# Patient Record
Sex: FEMALE | Race: WHITE | Employment: FULL TIME | URBAN - METROPOLITAN AREA
[De-identification: names, ages, dates, MRNs, and addresses within clinical notes are randomized per-mention and may not be internally consistent; named-entity substitution may affect disease eponyms.]

---

## 2018-06-20 ENCOUNTER — HOSPITAL ENCOUNTER (EMERGENCY)
Age: 33
Discharge: HOME | End: 2018-06-21
Attending: EMERGENCY MEDICINE
Payer: COMMERCIAL

## 2018-06-20 ENCOUNTER — APPOINTMENT (OUTPATIENT)
Dept: GENERAL RADIOLOGY | Age: 33
End: 2018-06-20
Payer: COMMERCIAL

## 2018-06-20 ENCOUNTER — APPOINTMENT (OUTPATIENT)
Dept: CT IMAGING | Age: 33
End: 2018-06-20
Payer: COMMERCIAL

## 2018-06-20 DIAGNOSIS — S01.01XA LACERATION OF SCALP, INITIAL ENCOUNTER: ICD-10-CM

## 2018-06-20 DIAGNOSIS — F10.920 ACUTE ALCOHOLIC INTOXICATION WITHOUT COMPLICATION (HCC): ICD-10-CM

## 2018-06-20 DIAGNOSIS — R46.89 COMBATIVE BEHAVIOR: Primary | ICD-10-CM

## 2018-06-20 DIAGNOSIS — D72.829 LEUKOCYTOSIS, UNSPECIFIED TYPE: ICD-10-CM

## 2018-06-20 LAB
ABSOLUTE EOS #: 0 K/UL (ref 0–0.4)
ABSOLUTE IMMATURE GRANULOCYTE: ABNORMAL K/UL (ref 0–0.3)
ABSOLUTE LYMPH #: 3.02 K/UL (ref 1–4.8)
ABSOLUTE MONO #: 0.32 K/UL (ref 0.1–1.3)
ACETAMINOPHEN LEVEL: <5 UG/ML (ref 10–30)
ALBUMIN SERPL-MCNC: 4.5 G/DL (ref 3.5–5.2)
ALBUMIN/GLOBULIN RATIO: ABNORMAL (ref 1–2.5)
ALP BLD-CCNC: 56 U/L (ref 35–104)
ALT SERPL-CCNC: 20 U/L (ref 5–33)
ANION GAP SERPL CALCULATED.3IONS-SCNC: 16 MMOL/L (ref 9–17)
AST SERPL-CCNC: 30 U/L
BASOPHILS # BLD: 0 % (ref 0–2)
BASOPHILS ABSOLUTE: 0 K/UL (ref 0–0.2)
BILIRUB SERPL-MCNC: <0.15 MG/DL (ref 0.3–1.2)
BUN BLDV-MCNC: 7 MG/DL (ref 6–20)
BUN/CREAT BLD: ABNORMAL (ref 9–20)
CALCIUM SERPL-MCNC: 8.8 MG/DL (ref 8.6–10.4)
CHLORIDE BLD-SCNC: 108 MMOL/L (ref 98–107)
CO2: 22 MMOL/L (ref 20–31)
CREAT SERPL-MCNC: 0.69 MG/DL (ref 0.5–0.9)
DIFFERENTIAL TYPE: ABNORMAL
EKG ATRIAL RATE: 86 BPM
EKG P AXIS: 56 DEGREES
EKG P-R INTERVAL: 170 MS
EKG Q-T INTERVAL: 370 MS
EKG QRS DURATION: 92 MS
EKG QTC CALCULATION (BAZETT): 442 MS
EKG R AXIS: 9 DEGREES
EKG T AXIS: 33 DEGREES
EKG VENTRICULAR RATE: 86 BPM
EOSINOPHILS RELATIVE PERCENT: 0 % (ref 0–4)
ETHANOL PERCENT: 0.23 %
ETHANOL: 229 MG/DL
GFR AFRICAN AMERICAN: >60 ML/MIN
GFR NON-AFRICAN AMERICAN: >60 ML/MIN
GFR SERPL CREATININE-BSD FRML MDRD: ABNORMAL ML/MIN/{1.73_M2}
GFR SERPL CREATININE-BSD FRML MDRD: ABNORMAL ML/MIN/{1.73_M2}
GLUCOSE BLD-MCNC: 108 MG/DL (ref 70–99)
HCT VFR BLD CALC: 41 % (ref 36–46)
HEMOGLOBIN: 13.9 G/DL (ref 12–16)
IMMATURE GRANULOCYTES: ABNORMAL %
LYMPHOCYTES # BLD: 19 % (ref 24–44)
MCH RBC QN AUTO: 27.9 PG (ref 26–34)
MCHC RBC AUTO-ENTMCNC: 33.8 G/DL (ref 31–37)
MCV RBC AUTO: 82.6 FL (ref 80–100)
MONOCYTES # BLD: 2 % (ref 1–7)
MORPHOLOGY: NORMAL
NRBC AUTOMATED: ABNORMAL PER 100 WBC
PDW BLD-RTO: 13.9 % (ref 11.5–14.9)
PLATELET # BLD: 333 K/UL (ref 150–450)
PLATELET ESTIMATE: ABNORMAL
PMV BLD AUTO: 8.6 FL (ref 6–12)
POTASSIUM SERPL-SCNC: 3.6 MMOL/L (ref 3.7–5.3)
RBC # BLD: 4.97 M/UL (ref 4–5.2)
RBC # BLD: ABNORMAL 10*6/UL
SALICYLATE LEVEL: <1 MG/DL (ref 3–10)
SEG NEUTROPHILS: 79 % (ref 36–66)
SEGMENTED NEUTROPHILS ABSOLUTE COUNT: 12.56 K/UL (ref 1.3–9.1)
SODIUM BLD-SCNC: 146 MMOL/L (ref 135–144)
TOTAL PROTEIN: 7.5 G/DL (ref 6.4–8.3)
WBC # BLD: 15.9 K/UL (ref 3.5–11)
WBC # BLD: ABNORMAL 10*3/UL

## 2018-06-20 PROCEDURE — 80307 DRUG TEST PRSMV CHEM ANLYZR: CPT

## 2018-06-20 PROCEDURE — 96374 THER/PROPH/DIAG INJ IV PUSH: CPT

## 2018-06-20 PROCEDURE — 73560 X-RAY EXAM OF KNEE 1 OR 2: CPT

## 2018-06-20 PROCEDURE — 80053 COMPREHEN METABOLIC PANEL: CPT

## 2018-06-20 PROCEDURE — 70450 CT HEAD/BRAIN W/O DYE: CPT

## 2018-06-20 PROCEDURE — G0480 DRUG TEST DEF 1-7 CLASSES: HCPCS

## 2018-06-20 PROCEDURE — 71046 X-RAY EXAM CHEST 2 VIEWS: CPT

## 2018-06-20 PROCEDURE — 85025 COMPLETE CBC W/AUTO DIFF WBC: CPT

## 2018-06-20 PROCEDURE — 6360000002 HC RX W HCPCS: Performed by: EMERGENCY MEDICINE

## 2018-06-20 PROCEDURE — 99284 EMERGENCY DEPT VISIT MOD MDM: CPT

## 2018-06-20 PROCEDURE — 84703 CHORIONIC GONADOTROPIN ASSAY: CPT

## 2018-06-20 PROCEDURE — 93005 ELECTROCARDIOGRAM TRACING: CPT

## 2018-06-20 PROCEDURE — 36415 COLL VENOUS BLD VENIPUNCTURE: CPT

## 2018-06-20 RX ORDER — ONDANSETRON 2 MG/ML
4 INJECTION INTRAMUSCULAR; INTRAVENOUS ONCE
Status: COMPLETED | OUTPATIENT
Start: 2018-06-20 | End: 2018-06-20

## 2018-06-20 RX ORDER — LAMOTRIGINE 25 MG/1
25 TABLET ORAL DAILY
COMMUNITY

## 2018-06-20 RX ADMIN — ONDANSETRON 4 MG: 2 INJECTION INTRAMUSCULAR; INTRAVENOUS at 23:57

## 2018-06-20 ASSESSMENT — PAIN DESCRIPTION - LOCATION: LOCATION: GENERALIZED

## 2018-06-20 ASSESSMENT — PAIN DESCRIPTION - PAIN TYPE: TYPE: ACUTE PAIN

## 2018-06-20 ASSESSMENT — PAIN SCALES - GENERAL: PAINLEVEL_OUTOF10: 10

## 2018-06-21 ENCOUNTER — APPOINTMENT (OUTPATIENT)
Dept: GENERAL RADIOLOGY | Age: 33
End: 2018-06-21
Payer: COMMERCIAL

## 2018-06-21 VITALS
HEART RATE: 93 BPM | TEMPERATURE: 98.4 F | WEIGHT: 180 LBS | OXYGEN SATURATION: 97 % | HEIGHT: 62 IN | RESPIRATION RATE: 23 BRPM | SYSTOLIC BLOOD PRESSURE: 113 MMHG | DIASTOLIC BLOOD PRESSURE: 72 MMHG | BODY MASS INDEX: 33.13 KG/M2

## 2018-06-21 LAB — HCG QUALITATIVE: NEGATIVE

## 2018-06-21 PROCEDURE — 96375 TX/PRO/DX INJ NEW DRUG ADDON: CPT

## 2018-06-21 PROCEDURE — 73560 X-RAY EXAM OF KNEE 1 OR 2: CPT

## 2018-06-21 PROCEDURE — 6360000002 HC RX W HCPCS: Performed by: EMERGENCY MEDICINE

## 2018-06-21 RX ORDER — LORAZEPAM 2 MG/ML
2 INJECTION INTRAMUSCULAR ONCE
Status: COMPLETED | OUTPATIENT
Start: 2018-06-21 | End: 2018-06-21

## 2018-06-21 RX ADMIN — LORAZEPAM 2 MG: 2 INJECTION INTRAMUSCULAR; INTRAVENOUS at 01:06

## 2018-06-21 ASSESSMENT — ENCOUNTER SYMPTOMS
BACK PAIN: 0
ABDOMINAL PAIN: 0
DIARRHEA: 0
NAUSEA: 0
SORE THROAT: 0
COUGH: 0
VOMITING: 0
EYE PAIN: 0
SHORTNESS OF BREATH: 0

## 2021-12-20 ENCOUNTER — HOSPITAL ENCOUNTER (EMERGENCY)
Facility: CLINIC | Age: 36
Discharge: HOME | End: 2021-12-20
Attending: SPECIALIST
Payer: COMMERCIAL

## 2021-12-20 VITALS
HEART RATE: 85 BPM | OXYGEN SATURATION: 100 % | SYSTOLIC BLOOD PRESSURE: 97 MMHG | TEMPERATURE: 98 F | DIASTOLIC BLOOD PRESSURE: 60 MMHG | WEIGHT: 140 LBS | RESPIRATION RATE: 16 BRPM | BODY MASS INDEX: 25.76 KG/M2 | HEIGHT: 62 IN

## 2021-12-20 DIAGNOSIS — R11.2 NAUSEA AND VOMITING, INTRACTABILITY OF VOMITING NOT SPECIFIED, UNSPECIFIED VOMITING TYPE: Primary | ICD-10-CM

## 2021-12-20 LAB
ABSOLUTE EOS #: 0 K/UL (ref 0–0.4)
ABSOLUTE IMMATURE GRANULOCYTE: ABNORMAL K/UL (ref 0–0.3)
ABSOLUTE LYMPH #: 1 K/UL (ref 1–4.8)
ABSOLUTE MONO #: 1.1 K/UL (ref 0.1–1.2)
ALBUMIN SERPL-MCNC: 4.9 G/DL (ref 3.5–5.2)
ALBUMIN/GLOBULIN RATIO: 2.6 (ref 1–2.5)
ALP BLD-CCNC: 57 U/L (ref 35–104)
ALT SERPL-CCNC: 26 U/L (ref 5–33)
ANION GAP SERPL CALCULATED.3IONS-SCNC: 12 MMOL/L (ref 9–17)
AST SERPL-CCNC: 25 U/L
BASOPHILS # BLD: 0 % (ref 0–2)
BASOPHILS ABSOLUTE: 0 K/UL (ref 0–0.2)
BILIRUB SERPL-MCNC: 0.5 MG/DL (ref 0.3–1.2)
BUN BLDV-MCNC: 23 MG/DL (ref 6–20)
BUN/CREAT BLD: ABNORMAL (ref 9–20)
CALCIUM SERPL-MCNC: 9.6 MG/DL (ref 8.6–10.4)
CHLORIDE BLD-SCNC: 104 MMOL/L (ref 98–107)
CO2: 24 MMOL/L (ref 20–31)
CREAT SERPL-MCNC: 0.7 MG/DL (ref 0.5–0.9)
DIFFERENTIAL TYPE: ABNORMAL
EOSINOPHILS RELATIVE PERCENT: 0 % (ref 1–4)
GFR AFRICAN AMERICAN: >60 ML/MIN
GFR NON-AFRICAN AMERICAN: >60 ML/MIN
GFR SERPL CREATININE-BSD FRML MDRD: ABNORMAL ML/MIN/{1.73_M2}
GFR SERPL CREATININE-BSD FRML MDRD: ABNORMAL ML/MIN/{1.73_M2}
GLUCOSE BLD-MCNC: 130 MG/DL (ref 70–99)
HCT VFR BLD CALC: 38.1 % (ref 36–46)
HEMOGLOBIN: 12.3 G/DL (ref 12–16)
IMMATURE GRANULOCYTES: ABNORMAL %
LIPASE: 22 U/L (ref 13–60)
LYMPHOCYTES # BLD: 5 % (ref 24–44)
MCH RBC QN AUTO: 26.2 PG (ref 26–34)
MCHC RBC AUTO-ENTMCNC: 32.2 G/DL (ref 31–37)
MCV RBC AUTO: 81.3 FL (ref 80–100)
MONOCYTES # BLD: 6 % (ref 2–11)
NRBC AUTOMATED: ABNORMAL PER 100 WBC
PDW BLD-RTO: 14 % (ref 12.5–15.4)
PLATELET # BLD: 388 K/UL (ref 140–450)
PLATELET ESTIMATE: ABNORMAL
PMV BLD AUTO: 7.2 FL (ref 6–12)
POTASSIUM SERPL-SCNC: 4 MMOL/L (ref 3.7–5.3)
RBC # BLD: 4.68 M/UL (ref 4–5.2)
RBC # BLD: ABNORMAL 10*6/UL
SEG NEUTROPHILS: 89 % (ref 36–66)
SEGMENTED NEUTROPHILS ABSOLUTE COUNT: 18.7 K/UL (ref 1.8–7.7)
SODIUM BLD-SCNC: 140 MMOL/L (ref 135–144)
TOTAL PROTEIN: 6.8 G/DL (ref 6.4–8.3)
WBC # BLD: 20.9 K/UL (ref 3.5–11)
WBC # BLD: ABNORMAL 10*3/UL

## 2021-12-20 PROCEDURE — 96374 THER/PROPH/DIAG INJ IV PUSH: CPT

## 2021-12-20 PROCEDURE — 80053 COMPREHEN METABOLIC PANEL: CPT

## 2021-12-20 PROCEDURE — 6360000002 HC RX W HCPCS

## 2021-12-20 PROCEDURE — 83690 ASSAY OF LIPASE: CPT

## 2021-12-20 PROCEDURE — 96375 TX/PRO/DX INJ NEW DRUG ADDON: CPT

## 2021-12-20 PROCEDURE — 85025 COMPLETE CBC W/AUTO DIFF WBC: CPT

## 2021-12-20 PROCEDURE — 2580000003 HC RX 258

## 2021-12-20 PROCEDURE — 99283 EMERGENCY DEPT VISIT LOW MDM: CPT

## 2021-12-20 PROCEDURE — 36415 COLL VENOUS BLD VENIPUNCTURE: CPT

## 2021-12-20 RX ORDER — 0.9 % SODIUM CHLORIDE 0.9 %
1000 INTRAVENOUS SOLUTION INTRAVENOUS ONCE
Status: COMPLETED | OUTPATIENT
Start: 2021-12-20 | End: 2021-12-20

## 2021-12-20 RX ORDER — ONDANSETRON 2 MG/ML
4 INJECTION INTRAMUSCULAR; INTRAVENOUS ONCE
Status: COMPLETED | OUTPATIENT
Start: 2021-12-20 | End: 2021-12-20

## 2021-12-20 RX ORDER — KETOROLAC TROMETHAMINE 30 MG/ML
30 INJECTION, SOLUTION INTRAMUSCULAR; INTRAVENOUS ONCE
Status: COMPLETED | OUTPATIENT
Start: 2021-12-20 | End: 2021-12-20

## 2021-12-20 RX ORDER — ONDANSETRON 4 MG/1
4 TABLET, ORALLY DISINTEGRATING ORAL EVERY 8 HOURS PRN
Qty: 10 TABLET | Refills: 0 | Status: SHIPPED | OUTPATIENT
Start: 2021-12-20 | End: 2021-12-30

## 2021-12-20 RX ADMIN — KETOROLAC TROMETHAMINE 30 MG: 30 INJECTION, SOLUTION INTRAMUSCULAR; INTRAVENOUS at 20:18

## 2021-12-20 RX ADMIN — SODIUM CHLORIDE 1000 ML: 9 INJECTION, SOLUTION INTRAVENOUS at 20:14

## 2021-12-20 RX ADMIN — ONDANSETRON 4 MG: 2 INJECTION INTRAMUSCULAR; INTRAVENOUS at 20:16

## 2021-12-20 ASSESSMENT — PAIN DESCRIPTION - FREQUENCY: FREQUENCY: CONTINUOUS

## 2021-12-20 ASSESSMENT — PAIN DESCRIPTION - ORIENTATION: ORIENTATION: RIGHT

## 2021-12-20 ASSESSMENT — PAIN DESCRIPTION - LOCATION: LOCATION: RIB CAGE

## 2021-12-20 ASSESSMENT — PAIN DESCRIPTION - DESCRIPTORS: DESCRIPTORS: SHARP

## 2021-12-20 ASSESSMENT — PAIN DESCRIPTION - PAIN TYPE: TYPE: ACUTE PAIN

## 2021-12-20 ASSESSMENT — PAIN DESCRIPTION - ONSET: ONSET: SUDDEN

## 2021-12-20 ASSESSMENT — PAIN SCALES - GENERAL: PAINLEVEL_OUTOF10: 7

## 2021-12-21 NOTE — ED PROVIDER NOTES
[facility]  [facility]  Phone: [phone] [facility],[place] [address]      Pt Name: Brooke Chisholm  MRN: [id number]  [place] 1985  Date of evaluation: 12/20/21      CHIEF COMPLAINT:  Chief Complaint   Patient presents with    Emesis    Sweats       HISTORY OF PRESENT ILLNESS    Brooke Chisholm is a 29 y.o. female who presents with complaints of right upper quadrant abdominal pain with nausea, vomiting and chills that she states started approximately 6 hours ago. She states that she was diagnosed 6 days ago at her primary care physician's office with strep and has been taking amoxicillin. She states that she has been tolerating her antibiotic. She is complaining of right upper quadrant abdominal pain that started today after onset of vomiting that she relates to a \"pulled muscle\". She states that when she gets ill she is normally \"pukey\". She is specifically requesting IV with fluids and Zofran or Phenergan and nonnarcotic pain medication. She denies fevers, diarrhea, dysuria, back pain, vaginal discharge or bleeding. She describes her pain to her right upper quadrant as sharp and moderate in intensity she states the pain is increased with movement and when she vomits. Nursing Notes were reviewed. REVIEW OF SYSTEMS       Constitutional: Complaining of chills. Denies fevers. Eyes: No visual changes. Neck: No neck pain. Respiratory: Denies recent shortness of breath. Cardiac:  Denies recent chest pain or palpitations  GI: Complaning of right upper quadrant abdominal pain with nausea and vomiting  : Denies dysuria or vaginal bleeding/discharge  Musculoskeletal: Denies myalgias or extremity pain  Neurologic: Denies headache or focal weakness. Skin:  Denies any rash. Negative in 10 essential Systems except as mentioned above and in the HPI. PAST MEDICAL HISTORY   PMH:  has no past medical history on file.   Surgical History:  has no past surgical history on file. Social History:  reports that she has quit smoking. Her smoking use included cigarettes. She does not have any smokeless tobacco history on file. She reports that she does not drink alcohol and does not use drugs. Family History: None  Psychiatric History: None    Allergies:has No Known Allergies. PHYSICAL EXAM     INITIAL VITALS: BP (!) 137/99   Pulse 65   Temp 98 °F (36.7 °C) (Oral)   Resp 12   Ht 5' 2\" (1.575 m)   Wt 63.5 kg (140 lb)   LMP  (Within Months) Comment: irregular due to birth control  SpO2 100%   BMI 25.61 kg/m²   Constitutional:  Well developed   Eyes:  Pupils equal/round  HENT:  Atraumatic, external ears normal, nose normal, oropharynx moist, posterior pharynx/tonsils without erythema or exudates  Respiratory:  Clear to auscultation bilaterally with good air exchange, no W/R/R  Cardiovascular:  RRR with normal S1 and S2  Gastrointestinal/Abdomen:  Soft, right upper quadrant TTP, BS present. No RLQ TTP. No mass appreciated. Musculoskeletal:  Normal to inspection  Back:  No midline/paraspinal TTP. No bilat CVA/flank TTP. Normal to inspection. Integument:  No rash.     Neurologic:  Alert & age appropriate mentation-interaction      DIAGNOSTIC RESULTS     EKG: All EKG's are interpreted by the Emergency Department Physician who either signs or Co-signs this chart in the absence of a cardiologist.    Not indicated    RADIOLOGY:   Reviewed the radiologist:  No orders to display       Not indicated      LABS:  Labs Reviewed   CBC WITH AUTO DIFFERENTIAL - Abnormal; Notable for the following components:       Result Value    WBC 20.9 (*)     Seg Neutrophils 89 (*)     Lymphocytes 5 (*)     Eosinophils % 0 (*)     Segs Absolute 18.70 (*)     All other components within normal limits   COMPREHENSIVE METABOLIC PANEL - Abnormal; Notable for the following components:    Glucose 130 (*)     BUN 23 (*)     Albumin/Globulin Ratio 2.6 (*)     All other components within normal limits   LIPASE         EMERGENCY DEPARTMENT COURSE, DDX and MDM:     IV with fluids. Labs to include CBC, CMP, lipase. Zofran for nausea. Toradol for pain. Will reevaluate. 2050: CBC indicates updated WBC 20.9, patient states that pain is resolved upon reassessment and is declining any further intervention at this time. She is requesting a prescription for Zofran ODT. Will prescribe and encourage follow-up with primary care physician. We estimate there is LOW risk for ACUTE APPENDICITIS, BOWEL OBSTRUCTION, CHOLECYSTITIS, DIVERTICULITIS, INCARCERATED HERNIA, PANCREATITIS, or PERFORATED BOWEL or ULCER, thus we consider the discharge disposition reasonable. Re-evaluation of the abdomen is benign. No guarding, peritoneal signs or significant tenderness noted. Also, there is no evidence or peritonitis, sepsis, or toxicity. The patient and/or family and I have discussed the diagnosis and risks, and we agree with discharging home to follow-up with their primary doctor. The patient presents with abdominal pain without signs of peritonitis or other life-threatening or serious etiology. The patient appears stable for discharge and has been instructed to return immediately if the symptoms worsen in any way, or in 8-12 hr if not improved for re-evaluation. We also discussed returning to the Emergency Department immediately if new or worsening symptoms occur. We have discussed the symptoms which are most concerning (e.g., bloody stool, fever, changing or worsening pain, persistent vomiting, chest pain shortness of breath, numbness or weakness to the arms or legs, coolness or color change to the arms or legs) that necessitate immediate return. The patient understands that at this time there is no evidence for a more malignant underlying process, but the patient also understands that early in the process of an illness or injury, an emergency department workup can be falsely reassuring.   Routine discharge counseling was given, and the patient understands that worsening, changing or persistent symptoms should prompt an immediate call or follow up with their primary physician or return to the emergency department. The importance of appropriate follow up was also discussed. We have reviewed the disposition diagnosis with the patient and or their family/guardian. We have answered their questions and given discharge instructions. They voiced understanding of these instructions and did not have any further questions or complaints. Orders Placed This Encounter   Medications    0.9 % sodium chloride bolus    ondansetron (ZOFRAN) injection 4 mg    ketorolac (TORADOL) injection 30 mg    ondansetron (ZOFRAN ODT) 4 MG disintegrating tablet     Sig: Take 1 tablet by mouth every 8 hours as needed for Nausea or Vomiting     Dispense:  10 tablet     Refill:  0       CONSULTS:  None      FINAL IMPRESSION      1.  Nausea and vomiting, intractability of vomiting not specified, unspecified vomiting type          DISPOSITION/PLAN:  DISPOSITION        PATIENT REFERRED TO:  Sandi Harman MD  [facility]  [phone]    Call in 2 days      [facility]  / [address] [phone]    As needed      DISCHARGE MEDICATIONS:  New Prescriptions    ONDANSETRON (ZOFRAN ODT) 4 MG DISINTEGRATING TABLET    Take 1 tablet by mouth every 8 hours as needed for Nausea or Vomiting       (Please note that portions of this note were completed with a voice recognition program.  Efforts were made to edit the dictations but occasionally words are mis-transcribed.)    CHRISTINA Conley - CHRISTINA Yost - CNP  12/20/21 2100       CHRISTINA Conley CNP  12/20/21 2104       CHRISTINA Conley - CNP  12/20/21 2200

## 2021-12-21 NOTE — ED PROVIDER NOTES
Emergency Department     Faculty Attestation    I performed a history and physical examination of the patient and discussed management with the mid level provideer. I reviewed the mid level provider's note and agree with the documented findings and plan of care. Any areas of disagreement are noted on the chart. I was personally present for the key portions of any procedures. I have documented in the chart those procedures where I was not present during the key portions. I have reviewed the emergency nurses triage note. I agree with the chief complaint, past medical history, past surgical history, allergies, medications, social and family history as documented unless otherwise noted below. Documentation of the HPI, Physical Exam and Medical Decision Making performed by medical students or scribes is based on my personal performance of the HPI, PE and MDM. For Physician Assistant/ Nurse Practitioner cases/documentation I have personally evaluated this patient and have completed at least one if not all key elements of the E/M (history, physical exam, and MDM). Additional findings are as noted.       Primary Care Physician:  Praveen Rojas MD    [address] [place] [address]       Chief Complaint   Patient presents with    Emesis    Sweats       RECENT VITALS:   Temp: 98 °F (36.7 °C),  Pulse: 85, Resp: 16, BP: 97/60    LABS:  Labs Reviewed   CBC WITH AUTO DIFFERENTIAL - Abnormal; Notable for the following components:       Result Value    WBC 20.9 (*)     Seg Neutrophils 89 (*)     Lymphocytes 5 (*)     Eosinophils % 0 (*)     Segs Absolute 18.70 (*)     All other components within normal limits   COMPREHENSIVE METABOLIC PANEL - Abnormal; Notable for the following components:    Glucose 130 (*)     BUN 23 (*)     Albumin/Globulin Ratio 2.6 (*)     All other components within normal limits   LIPASE         PERTINENT ATTENDING PHYSICIAN COMMENTS:    70-year-old female patient presents to the emergency department for evaluation of nausea, vomiting, abdominal pain and chills. Symptoms started about 6 hours ago. Patient was diagnosed to have streptococcal pharyngitis about 6 weeks ago and has been taking amoxicillin. Sore throat has improved. She denies any constipation or diarrhea and denies any fever or chills. She also denies any urinary symptoms. Patient is afebrile and vital signs are stable. Her lungs are clear to auscultation, rhythm is regular without murmurs. Abdomen is soft with mild tenderness in the right lower rib cage area. Kerline Abbot sign is negative. There is no hepatosplenomegaly. No pulsatile mass. She has active bowel sounds. There is no rebound tenderness. Extremities without any edema or tenderness. Neurologically no focal deficits. During the ED course, patient was given IV fluids, Toradol and Zofran. She is feeling much better and resting comfortably. Diagnostic work-up included CBC, CMP and serum lipase. She has leukocytosis, rest of the labs are unremarkable. Upon reevaluation her abdomen is soft and nontender with active bowel sounds. Patient was offered further work-up including urinalysis, chest x-ray and possibly CAT scan of the abdomen and pelvis but she declines as she is feeling much better. She was discharged home on Zofran ODT as needed for the nausea, Tylenol and/or ibuprofen as needed for the pain, continue current medications, plenty of oral fluids, follow-up with PCP, return if worse. The patient understands that at this time there is no evidence for a more malignant underlying process, but also understands that early in the process of an illness or injury, an emergency department workup can be falsely reassuring. Routine discharge counseling was given, and it is understood that worsening, changing or persistent symptoms should prompt an immediate call or follow up with their primary physician or return to the emergency department.  The importance of appropriate follow up was also discussed. I have reviewed the disposition diagnosis. I have answered the questions and given discharge instructions. There was voiced understanding of these instructions and no further questions or complaints.         Dayami Lovell MD  12/21/21 2270

## 2021-12-21 NOTE — ED TRIAGE NOTES
Pt to ED from home with c/o vomiting and chills. Also reports headache, cough, sore throat, weakness,  diarrhea, and abd pain. Onset of vomiting x6 hours ago. Other symptoms cont. from positive Strep. diagnosis. Pt currently on antibiotic regimen for Strep. Denies chest pain. Pt vaccinated. PCP - Dr. Chay Tuttle. Alert and oriented. Ambulatory with steady gait. Respirations unlabored. Abd soft and nontender.

## 2025-08-13 ENCOUNTER — HOSPITAL ENCOUNTER (EMERGENCY)
Age: 40
Discharge: HOME | End: 2025-08-13
Attending: EMERGENCY MEDICINE
Payer: COMMERCIAL

## 2025-08-13 VITALS
RESPIRATION RATE: 24 BRPM | DIASTOLIC BLOOD PRESSURE: 71 MMHG | BODY MASS INDEX: 25.61 KG/M2 | OXYGEN SATURATION: 96 % | WEIGHT: 140 LBS | SYSTOLIC BLOOD PRESSURE: 104 MMHG | HEART RATE: 65 BPM

## 2025-08-13 DIAGNOSIS — R11.2 NAUSEA AND VOMITING, UNSPECIFIED VOMITING TYPE: ICD-10-CM

## 2025-08-13 DIAGNOSIS — F41.1 ANXIETY STATE: Primary | ICD-10-CM

## 2025-08-13 LAB
ALBUMIN SERPL-MCNC: 4 G/DL (ref 3.5–5.2)
ALBUMIN/GLOB SERPL: 1.6 {RATIO} (ref 1–2.5)
ALP SERPL-CCNC: 42 U/L (ref 35–104)
ALT SERPL-CCNC: 19 U/L (ref 10–35)
ANION GAP SERPL CALCULATED.3IONS-SCNC: 14 MMOL/L (ref 9–16)
AST SERPL-CCNC: 23 U/L (ref 10–35)
BASOPHILS # BLD: 0.05 K/UL (ref 0–0.2)
BASOPHILS NFR BLD: 0 % (ref 0–2)
BILIRUB SERPL-MCNC: 0.4 MG/DL (ref 0–1.2)
BUN SERPL-MCNC: 18 MG/DL (ref 6–20)
CALCIUM SERPL-MCNC: 8.6 MG/DL (ref 8.6–10.4)
CHLORIDE SERPL-SCNC: 103 MMOL/L (ref 98–107)
CO2 SERPL-SCNC: 23 MMOL/L (ref 20–31)
CREAT SERPL-MCNC: 0.9 MG/DL (ref 0.5–0.9)
EOSINOPHIL # BLD: 0.05 K/UL (ref 0–0.44)
EOSINOPHILS RELATIVE PERCENT: 0 % (ref 1–4)
ERYTHROCYTE [DISTWIDTH] IN BLOOD BY AUTOMATED COUNT: 13.1 % (ref 11.8–14.4)
GFR, ESTIMATED: 88 ML/MIN/1.73M2
GLUCOSE SERPL-MCNC: 130 MG/DL (ref 74–99)
HCT VFR BLD AUTO: 36.6 % (ref 36.3–47.1)
HGB BLD-MCNC: 12.1 G/DL (ref 11.9–15.1)
IMM GRANULOCYTES # BLD AUTO: 0.06 K/UL (ref 0–0.3)
IMM GRANULOCYTES NFR BLD: 0 %
LIPASE SERPL-CCNC: 66 U/L (ref 13–60)
LYMPHOCYTES NFR BLD: 2.46 K/UL (ref 1.1–3.7)
LYMPHOCYTES RELATIVE PERCENT: 14 % (ref 24–43)
MCH RBC QN AUTO: 27 PG (ref 25.2–33.5)
MCHC RBC AUTO-ENTMCNC: 33.1 G/DL (ref 28.4–34.8)
MCV RBC AUTO: 81.7 FL (ref 82.6–102.9)
MONOCYTES NFR BLD: 0.85 K/UL (ref 0.1–1.2)
MONOCYTES NFR BLD: 5 % (ref 3–12)
NEUTROPHILS NFR BLD: 81 % (ref 36–65)
NEUTS SEG NFR BLD: 14.03 K/UL (ref 1.5–8.1)
NRBC BLD-RTO: 0 PER 100 WBC
PLATELET # BLD AUTO: 334 K/UL (ref 138–453)
PMV BLD AUTO: 10.6 FL (ref 8.1–13.5)
POTASSIUM SERPL-SCNC: 3.2 MMOL/L (ref 3.7–5.3)
PROT SERPL-MCNC: 6.5 G/DL (ref 6.6–8.7)
RBC # BLD AUTO: 4.48 M/UL (ref 3.95–5.11)
RBC # BLD: ABNORMAL 10*6/UL
SODIUM SERPL-SCNC: 141 MMOL/L (ref 136–145)
WBC OTHER # BLD: 17.5 K/UL (ref 3.5–11.3)

## 2025-08-13 PROCEDURE — 96374 THER/PROPH/DIAG INJ IV PUSH: CPT

## 2025-08-13 PROCEDURE — 96375 TX/PRO/DX INJ NEW DRUG ADDON: CPT

## 2025-08-13 PROCEDURE — 6360000002 HC RX W HCPCS: Performed by: EMERGENCY MEDICINE

## 2025-08-13 PROCEDURE — 80053 COMPREHEN METABOLIC PANEL: CPT

## 2025-08-13 PROCEDURE — 99284 EMERGENCY DEPT VISIT MOD MDM: CPT

## 2025-08-13 PROCEDURE — 2580000003 HC RX 258: Performed by: EMERGENCY MEDICINE

## 2025-08-13 PROCEDURE — 83690 ASSAY OF LIPASE: CPT

## 2025-08-13 PROCEDURE — 6370000000 HC RX 637 (ALT 250 FOR IP): Performed by: EMERGENCY MEDICINE

## 2025-08-13 PROCEDURE — 85025 COMPLETE CBC W/AUTO DIFF WBC: CPT

## 2025-08-13 RX ORDER — HALOPERIDOL 5 MG/ML
5 INJECTION INTRAMUSCULAR ONCE
Status: COMPLETED | OUTPATIENT
Start: 2025-08-13 | End: 2025-08-13

## 2025-08-13 RX ORDER — 0.9 % SODIUM CHLORIDE 0.9 %
1000 INTRAVENOUS SOLUTION INTRAVENOUS ONCE
Status: COMPLETED | OUTPATIENT
Start: 2025-08-13 | End: 2025-08-13

## 2025-08-13 RX ORDER — ONDANSETRON 2 MG/ML
4 INJECTION INTRAMUSCULAR; INTRAVENOUS ONCE
Status: COMPLETED | OUTPATIENT
Start: 2025-08-13 | End: 2025-08-13

## 2025-08-13 RX ORDER — LORAZEPAM 2 MG/ML
1 INJECTION INTRAMUSCULAR ONCE
Status: COMPLETED | OUTPATIENT
Start: 2025-08-13 | End: 2025-08-13

## 2025-08-13 RX ORDER — ONDANSETRON 4 MG/1
4 TABLET, ORALLY DISINTEGRATING ORAL 3 TIMES DAILY PRN
Qty: 15 TABLET | Refills: 0 | Status: SHIPPED | OUTPATIENT
Start: 2025-08-13 | End: 2025-08-28

## 2025-08-13 RX ORDER — ALPRAZOLAM 0.5 MG
0.5 TABLET ORAL ONCE
Status: COMPLETED | OUTPATIENT
Start: 2025-08-13 | End: 2025-08-13

## 2025-08-13 RX ORDER — ALPRAZOLAM 0.5 MG
0.5 TABLET ORAL 2 TIMES DAILY PRN
Qty: 20 TABLET | Refills: 0 | Status: SHIPPED | OUTPATIENT
Start: 2025-08-13 | End: 2025-08-23

## 2025-08-13 RX ORDER — LORAZEPAM 2 MG/ML
2 INJECTION INTRAMUSCULAR ONCE
Status: COMPLETED | OUTPATIENT
Start: 2025-08-13 | End: 2025-08-13

## 2025-08-13 RX ORDER — METOCLOPRAMIDE HYDROCHLORIDE 5 MG/ML
10 INJECTION INTRAMUSCULAR; INTRAVENOUS ONCE
Status: COMPLETED | OUTPATIENT
Start: 2025-08-13 | End: 2025-08-13

## 2025-08-13 RX ADMIN — METOCLOPRAMIDE HYDROCHLORIDE 10 MG: 5 INJECTION INTRAMUSCULAR; INTRAVENOUS at 10:48

## 2025-08-13 RX ADMIN — SODIUM CHLORIDE 1000 ML: 0.9 INJECTION, SOLUTION INTRAVENOUS at 10:54

## 2025-08-13 RX ADMIN — HALOPERIDOL LACTATE 5 MG: 5 INJECTION, SOLUTION INTRAMUSCULAR at 11:17

## 2025-08-13 RX ADMIN — Medication 2 MG: at 11:17

## 2025-08-13 RX ADMIN — ALPRAZOLAM 0.5 MG: 0.5 TABLET ORAL at 15:16

## 2025-08-13 RX ADMIN — ONDANSETRON 4 MG: 2 INJECTION, SOLUTION INTRAMUSCULAR; INTRAVENOUS at 15:16

## 2025-08-13 RX ADMIN — Medication 1 MG: at 10:48

## 2025-08-13 RX ADMIN — SODIUM CHLORIDE 1000 ML: 0.9 INJECTION, SOLUTION INTRAVENOUS at 14:20

## 2025-08-13 ASSESSMENT — PAIN - FUNCTIONAL ASSESSMENT: PAIN_FUNCTIONAL_ASSESSMENT: 0-10

## 2025-08-13 ASSESSMENT — PAIN SCALES - GENERAL: PAINLEVEL_OUTOF10: 0
